# Patient Record
Sex: FEMALE | Race: ASIAN | Employment: STUDENT | URBAN - METROPOLITAN AREA
[De-identification: names, ages, dates, MRNs, and addresses within clinical notes are randomized per-mention and may not be internally consistent; named-entity substitution may affect disease eponyms.]

---

## 2019-07-03 ENCOUNTER — OFFICE VISIT (OUTPATIENT)
Dept: FAMILY MEDICINE CLINIC | Facility: CLINIC | Age: 19
End: 2019-07-03
Payer: COMMERCIAL

## 2019-07-03 ENCOUNTER — APPOINTMENT (OUTPATIENT)
Dept: LAB | Facility: HOSPITAL | Age: 19
End: 2019-07-03
Attending: FAMILY MEDICINE
Payer: COMMERCIAL

## 2019-07-03 VITALS
OXYGEN SATURATION: 98 % | RESPIRATION RATE: 13 BRPM | DIASTOLIC BLOOD PRESSURE: 76 MMHG | WEIGHT: 109 LBS | HEART RATE: 86 BPM | BODY MASS INDEX: 21.97 KG/M2 | HEIGHT: 59 IN | SYSTOLIC BLOOD PRESSURE: 118 MMHG

## 2019-07-03 DIAGNOSIS — Z02.1 PRE-EMPLOYMENT HEALTH SCREENING EXAMINATION: ICD-10-CM

## 2019-07-03 DIAGNOSIS — Z00.00 WELLNESS EXAMINATION: Primary | ICD-10-CM

## 2019-07-03 LAB
AMPHET UR QL SCN: NEGATIVE
BARBITURATES UR QL SCN: NEGATIVE
BENZODIAZ UR QL SCN: NEGATIVE
CANNABINOIDS UR QL SCN: NEGATIVE
COCAINE UR QL: NEGATIVE
HBV SURFACE AB SER QL: NONREACTIVE
HBV SURFACE AB SERPL IA-ACNC: <3.1 MIU/ML
MDMA UR QL SCN: NEGATIVE
METHADONE UR QL SCN: NEGATIVE
OPIATES UR QL SCN: NEGATIVE
OXYCODONE UR QL SCN: NEGATIVE
PCP UR QL SCN: NEGATIVE
RUBV IGG SER QL: POSITIVE
RUBV IGG SER-ACNC: 361.8 IU/ML (ref 10–?)

## 2019-07-03 PROCEDURE — 86765 RUBEOLA ANTIBODY: CPT

## 2019-07-03 PROCEDURE — 86480 TB TEST CELL IMMUN MEASURE: CPT

## 2019-07-03 PROCEDURE — 80307 DRUG TEST PRSMV CHEM ANLYZR: CPT

## 2019-07-03 PROCEDURE — 86706 HEP B SURFACE ANTIBODY: CPT

## 2019-07-03 PROCEDURE — 86735 MUMPS ANTIBODY: CPT

## 2019-07-03 PROCEDURE — 36415 COLL VENOUS BLD VENIPUNCTURE: CPT

## 2019-07-03 PROCEDURE — 86762 RUBELLA ANTIBODY: CPT

## 2019-07-03 PROCEDURE — 86787 VARICELLA-ZOSTER ANTIBODY: CPT

## 2019-07-03 PROCEDURE — 99385 PREV VISIT NEW AGE 18-39: CPT | Performed by: FAMILY MEDICINE

## 2019-07-03 NOTE — PROGRESS NOTES
CC: Annual Physical Exam    HPI:   Leon Mascorro is a 19year old female who presents for a complete physical exam.    HCM  -Diet:  Well-balanced.   -Exercise regularly  -Mental Health: denies any depression or anxiety sx  -Skin care:  no concerning lesio Date)   SpO2 98%   BMI 22.02 kg/m²  Estimated body mass index is 22.02 kg/m² as calculated from the following:    Height as of this encounter: 59\". Weight as of this encounter: 109 lb.    Wt Readings from Last 3 Encounters:  07/03/19 : 109 lb      Physi IGG-IMMUNITY; Future  - VARICELLA ZOSTER, IGG; Future  - MUMPS ANTIBODIES, IGG-IMMUNITY;  Future  - QUANTIFERON TB; Future  - DRUG ABUSE PANEL 10 SCREEN; Future    Annual Physical due on 10/12/2001  Annual Depression Screen due on 10/12/2010  HPV Vaccines(1

## 2019-07-05 LAB
M TB IFN-G CD4+ T-CELLS BLD-ACNC: 0.01 IU/ML
M TB TUBERC IFN-G BLD QL: NEGATIVE
M TB TUBERC IGNF/MITOGEN IGNF CONTROL: >10 IU/ML
QUANTIFERON TB1 MINUS NIL: 0.01 IU/ML
QUANTIFERON TB2 MINUS NIL: 0.02 IU/ML

## 2019-07-08 LAB
MEV IGG SER-ACNC: 119 AU/ML (ref 30–?)
MUV IGG SER IA-ACNC: 30.4 AU/ML (ref 11–?)
VZV IGG SER IA-ACNC: 2101 (ref 165–?)

## 2019-07-09 ENCOUNTER — TELEPHONE (OUTPATIENT)
Dept: FAMILY MEDICINE CLINIC | Facility: CLINIC | Age: 19
End: 2019-07-09

## 2019-07-09 NOTE — TELEPHONE ENCOUNTER
----- Message from Dania Le MD sent at 7/9/2019  3:48 PM CDT -----  Please let her know that all her titers are negative as well as the tuberculosis  And drug screen

## 2019-07-09 NOTE — TELEPHONE ENCOUNTER
Calling to get lab results. Patient as also stating she spoke to her school and they do not have a specific form that needs to be filled out for the physical. They only require a letter stating patient is in good health and the blood work  results.

## 2019-08-13 ENCOUNTER — OFFICE VISIT (OUTPATIENT)
Dept: FAMILY MEDICINE CLINIC | Facility: CLINIC | Age: 19
End: 2019-08-13
Payer: COMMERCIAL

## 2019-08-13 VITALS
OXYGEN SATURATION: 98 % | WEIGHT: 110 LBS | DIASTOLIC BLOOD PRESSURE: 62 MMHG | HEIGHT: 59 IN | HEART RATE: 86 BPM | SYSTOLIC BLOOD PRESSURE: 110 MMHG | BODY MASS INDEX: 22.18 KG/M2

## 2019-08-13 DIAGNOSIS — H61.22 IMPACTED CERUMEN OF LEFT EAR: Primary | ICD-10-CM

## 2019-08-13 DIAGNOSIS — Z23 NEED FOR VACCINATION: ICD-10-CM

## 2019-08-13 PROCEDURE — 90471 IMMUNIZATION ADMIN: CPT | Performed by: FAMILY MEDICINE

## 2019-08-13 PROCEDURE — 90746 HEPB VACCINE 3 DOSE ADULT IM: CPT | Performed by: FAMILY MEDICINE

## 2019-08-13 PROCEDURE — 69210 REMOVE IMPACTED EAR WAX UNI: CPT | Performed by: FAMILY MEDICINE

## 2019-08-13 PROCEDURE — 99213 OFFICE O/P EST LOW 20 MIN: CPT | Performed by: FAMILY MEDICINE

## 2019-08-15 NOTE — PROGRESS NOTES
HPI:   Patient presents with:  Ear Problem: c/c left ear feels clogged      Taylor Torres is a 19year old female presenting for:    Left ear feels clogged   -for the past few days  -hearing is muffled  -no pain, discharge  -no recent swimming  -tried d No past surgical history on file.   No Known Allergies   Social History:  Social History    Tobacco Use      Smoking status: Never Smoker      Smokeless tobacco: Never Used    Alcohol use: Never      Frequency: Never    Drug use: Not on file     Family Hi There is no tenderness. Neurological: No cranial nerve deficit. Skin: No rash noted.            ASSESSMENT AND PLAN:   Patient is a 19year old female who presents primarily presents for:    Diagnoses and all orders for this visit:    Impacted cerumen o

## 2019-08-15 NOTE — PROCEDURES
Cerumen Removal Procedure. Patient gave verbal consent. Risks and Benefits of removal were discussed with the patient, who agreed to proceed with procedure.    On left Ear  Indication: cerumen impaction, TM not visible  Prepped with Hydrogen Peroxide,

## 2020-02-06 ENCOUNTER — NURSE ONLY (OUTPATIENT)
Dept: FAMILY MEDICINE CLINIC | Facility: CLINIC | Age: 20
End: 2020-02-06
Payer: COMMERCIAL

## 2020-02-06 DIAGNOSIS — Z23 NEED FOR HEPATITIS B BOOSTER VACCINATION: Primary | ICD-10-CM

## 2020-02-06 PROCEDURE — 90746 HEPB VACCINE 3 DOSE ADULT IM: CPT | Performed by: FAMILY MEDICINE

## 2020-02-06 PROCEDURE — 90471 IMMUNIZATION ADMIN: CPT | Performed by: FAMILY MEDICINE

## 2020-02-06 NOTE — PROGRESS NOTES
Patient came in today to get 2ND Hep B vaccine, name and  of patient were verified and it was given on the R arm without complications.

## 2020-08-12 ENCOUNTER — NURSE ONLY (OUTPATIENT)
Dept: FAMILY MEDICINE CLINIC | Facility: CLINIC | Age: 20
End: 2020-08-12
Payer: COMMERCIAL

## 2020-08-12 PROCEDURE — 90746 HEPB VACCINE 3 DOSE ADULT IM: CPT | Performed by: FAMILY MEDICINE

## 2020-08-12 PROCEDURE — 36415 COLL VENOUS BLD VENIPUNCTURE: CPT | Performed by: FAMILY MEDICINE

## 2020-08-12 PROCEDURE — 90471 IMMUNIZATION ADMIN: CPT | Performed by: FAMILY MEDICINE

## 2020-08-13 ENCOUNTER — APPOINTMENT (OUTPATIENT)
Dept: LAB | Facility: HOSPITAL | Age: 20
End: 2020-08-13
Attending: INTERNAL MEDICINE
Payer: COMMERCIAL

## 2020-08-13 ENCOUNTER — OFFICE VISIT (OUTPATIENT)
Dept: FAMILY MEDICINE CLINIC | Facility: CLINIC | Age: 20
End: 2020-08-13
Payer: COMMERCIAL

## 2020-08-13 VITALS
HEIGHT: 58 IN | WEIGHT: 100 LBS | BODY MASS INDEX: 20.99 KG/M2 | SYSTOLIC BLOOD PRESSURE: 86 MMHG | DIASTOLIC BLOOD PRESSURE: 60 MMHG

## 2020-08-13 DIAGNOSIS — Z00.00 ANNUAL PHYSICAL EXAM: Primary | ICD-10-CM

## 2020-08-13 DIAGNOSIS — Z00.00 ANNUAL PHYSICAL EXAM: ICD-10-CM

## 2020-08-13 PROCEDURE — 36415 COLL VENOUS BLD VENIPUNCTURE: CPT | Performed by: INTERNAL MEDICINE

## 2020-08-13 PROCEDURE — 36415 COLL VENOUS BLD VENIPUNCTURE: CPT

## 2020-08-13 PROCEDURE — 3008F BODY MASS INDEX DOCD: CPT | Performed by: INTERNAL MEDICINE

## 2020-08-13 PROCEDURE — 3078F DIAST BP <80 MM HG: CPT | Performed by: INTERNAL MEDICINE

## 2020-08-13 PROCEDURE — 86480 TB TEST CELL IMMUN MEASURE: CPT

## 2020-08-13 PROCEDURE — 3074F SYST BP LT 130 MM HG: CPT | Performed by: INTERNAL MEDICINE

## 2020-08-13 PROCEDURE — 99395 PREV VISIT EST AGE 18-39: CPT | Performed by: INTERNAL MEDICINE

## 2020-08-14 LAB
M TB IFN-G CD4+ T-CELLS BLD-ACNC: 0.02 IU/ML
M TB TUBERC IFN-G BLD QL: NEGATIVE
M TB TUBERC IGNF/MITOGEN IGNF CONTROL: >10 IU/ML
QUANTIFERON TB1 MINUS NIL: 0.01 IU/ML
QUANTIFERON TB2 MINUS NIL: 0.02 IU/ML

## 2020-08-19 NOTE — PROGRESS NOTES
[facility]  Return Patient Progress Note      HPI:   Patient presents with:  Physical: yearly exam no pap  Lab: needs order for Maverick Hansen is a 22year old female presenting for: annual exam.  Requesting quantife found for: CHOLEST, HDL, TRIG, LDL, NONHDLC     Medications:  No current outpatient medications on file. PMH:  No past medical history on file. PSH:  No past surgical history on file.     Allergies:  No Known Allergies   Social History:  Social Hi Position: Sitting, Cuff Size: adult)   Ht 58\"   Wt 100 lb (45.4 kg)   LMP 07/13/2020 (Approximate)   BMI 20.90 kg/m²  Estimated body mass index is 20.9 kg/m² as calculated from the following:    Height as of this encounter: 58\".     Weight as of this enco Female 2-dose series) due on 10/12/2009  Pap Smear,3 Years due on 10/12/2019  Annual Physical due on 07/03/2020  Influenza Vaccine(1) due on 09/01/2020  Annual Depression Screen due on 08/13/2021  DTaP,Tdap,and Td Vaccines(4 - Td) due on 08/12/2023  Hepati

## 2020-08-21 ENCOUNTER — LAB ENCOUNTER (OUTPATIENT)
Dept: LAB | Age: 20
End: 2020-08-21
Attending: INTERNAL MEDICINE
Payer: COMMERCIAL

## 2020-08-21 ENCOUNTER — OFFICE VISIT (OUTPATIENT)
Dept: INTERNAL MEDICINE CLINIC | Facility: CLINIC | Age: 20
End: 2020-08-21
Payer: COMMERCIAL

## 2020-08-21 VITALS
SYSTOLIC BLOOD PRESSURE: 107 MMHG | HEART RATE: 66 BPM | HEIGHT: 58 IN | RESPIRATION RATE: 16 BRPM | WEIGHT: 99.19 LBS | DIASTOLIC BLOOD PRESSURE: 72 MMHG | BODY MASS INDEX: 20.82 KG/M2

## 2020-08-21 DIAGNOSIS — N92.6 IRREGULAR PERIODS: Primary | ICD-10-CM

## 2020-08-21 DIAGNOSIS — N92.6 IRREGULAR PERIODS: ICD-10-CM

## 2020-08-21 LAB
ALBUMIN SERPL-MCNC: 3.8 G/DL (ref 3.4–5)
ALBUMIN/GLOB SERPL: 1.1 {RATIO} (ref 1–2)
ALP LIVER SERPL-CCNC: 126 U/L (ref 52–144)
ALT SERPL-CCNC: 85 U/L (ref 13–56)
ANION GAP SERPL CALC-SCNC: 8 MMOL/L (ref 0–18)
AST SERPL-CCNC: 46 U/L (ref 15–37)
B-HCG UR QL: NEGATIVE
BASOPHILS # BLD AUTO: 0.06 X10(3) UL (ref 0–0.2)
BASOPHILS NFR BLD AUTO: 1.2 %
BILIRUB SERPL-MCNC: 0.3 MG/DL (ref 0.1–2)
BUN BLD-MCNC: 20 MG/DL (ref 7–18)
BUN/CREAT SERPL: 25 (ref 10–20)
CALCIUM BLD-MCNC: 9.4 MG/DL (ref 8.5–10.1)
CHLORIDE SERPL-SCNC: 108 MMOL/L (ref 98–112)
CO2 SERPL-SCNC: 26 MMOL/L (ref 21–32)
CREAT BLD-MCNC: 0.8 MG/DL (ref 0.55–1.02)
DEPRECATED RDW RBC AUTO: 41.5 FL (ref 35.1–46.3)
EOSINOPHIL # BLD AUTO: 0.2 X10(3) UL (ref 0–0.7)
EOSINOPHIL NFR BLD AUTO: 4.1 %
ERYTHROCYTE [DISTWIDTH] IN BLOOD BY AUTOMATED COUNT: 12.9 % (ref 11–15)
GLOBULIN PLAS-MCNC: 3.6 G/DL (ref 2.8–4.4)
GLUCOSE BLD-MCNC: 77 MG/DL (ref 70–99)
HCT VFR BLD AUTO: 40 % (ref 35–48)
HGB BLD-MCNC: 13.5 G/DL (ref 12–16)
IMM GRANULOCYTES # BLD AUTO: 0.01 X10(3) UL (ref 0–1)
IMM GRANULOCYTES NFR BLD: 0.2 %
LYMPHOCYTES # BLD AUTO: 1.27 X10(3) UL (ref 1–4)
LYMPHOCYTES NFR BLD AUTO: 26.3 %
M PROTEIN MFR SERPL ELPH: 7.4 G/DL (ref 6.4–8.2)
MCH RBC QN AUTO: 29.7 PG (ref 26–34)
MCHC RBC AUTO-ENTMCNC: 33.8 G/DL (ref 31–37)
MCV RBC AUTO: 88.1 FL (ref 80–100)
MONOCYTES # BLD AUTO: 0.45 X10(3) UL (ref 0.1–1)
MONOCYTES NFR BLD AUTO: 9.3 %
NEUTROPHILS # BLD AUTO: 2.83 X10 (3) UL (ref 1.5–7.7)
NEUTROPHILS # BLD AUTO: 2.83 X10(3) UL (ref 1.5–7.7)
NEUTROPHILS NFR BLD AUTO: 58.9 %
OSMOLALITY SERPL CALC.SUM OF ELEC: 295 MOSM/KG (ref 275–295)
PATIENT FASTING Y/N/NP: YES
PLATELET # BLD AUTO: 291 10(3)UL (ref 150–450)
POTASSIUM SERPL-SCNC: 4 MMOL/L (ref 3.5–5.1)
RBC # BLD AUTO: 4.54 X10(6)UL (ref 3.8–5.3)
SODIUM SERPL-SCNC: 142 MMOL/L (ref 136–145)
TSI SER-ACNC: 0.6 MIU/ML (ref 0.36–3.74)
WBC # BLD AUTO: 4.8 X10(3) UL (ref 4–11)

## 2020-08-21 PROCEDURE — 81025 URINE PREGNANCY TEST: CPT

## 2020-08-21 PROCEDURE — 84443 ASSAY THYROID STIM HORMONE: CPT

## 2020-08-21 PROCEDURE — 3008F BODY MASS INDEX DOCD: CPT | Performed by: INTERNAL MEDICINE

## 2020-08-21 PROCEDURE — 3078F DIAST BP <80 MM HG: CPT | Performed by: INTERNAL MEDICINE

## 2020-08-21 PROCEDURE — 3074F SYST BP LT 130 MM HG: CPT | Performed by: INTERNAL MEDICINE

## 2020-08-21 PROCEDURE — 85025 COMPLETE CBC W/AUTO DIFF WBC: CPT

## 2020-08-21 PROCEDURE — 80053 COMPREHEN METABOLIC PANEL: CPT

## 2020-08-21 PROCEDURE — 99203 OFFICE O/P NEW LOW 30 MIN: CPT | Performed by: INTERNAL MEDICINE

## 2020-08-21 PROCEDURE — 36415 COLL VENOUS BLD VENIPUNCTURE: CPT

## 2020-08-21 NOTE — PATIENT INSTRUCTIONS
Hormones and Your Menstrual Cycle  A woman's menstrual cycle (monthly period) is controlled by changing levels of certain hormones. These hormones travel through the blood. Two hormones, estrogen and progesterone, play a big role in the menstrual cycle.

## 2020-08-21 NOTE — PROGRESS NOTES
Rommel Delgado is a 22year old female.   Patient presents with:  Irregular Periods      HPI:   New pt -- pts pcp -- last seen aug -- dr Merari Yañez  C/c irregular periods   C/o LMP late May --since then spotting for one day , not more than a day --   M thyroid and refer to GYN      The patient indicates understanding of these issues and agrees to the plan. No follow-ups on file.

## 2020-08-26 ENCOUNTER — TELEPHONE (OUTPATIENT)
Dept: INTERNAL MEDICINE CLINIC | Facility: CLINIC | Age: 20
End: 2020-08-26

## 2020-08-26 DIAGNOSIS — R74.8 ELEVATED LIVER ENZYMES: Primary | ICD-10-CM

## 2020-08-26 NOTE — TELEPHONE ENCOUNTER
Patient calling asking to review lab results ;    Provided information to call Central scheduling at [phone] to have future blood work drawn   Patient verbalizes understanding and agrees.            Patient Result Comments     Viewed by Deisi Blevins

## 2020-11-06 ENCOUNTER — MOBILE ENCOUNTER (OUTPATIENT)
Dept: INTERNAL MEDICINE CLINIC | Facility: CLINIC | Age: 20
End: 2020-11-06

## 2020-11-06 ENCOUNTER — LAB ENCOUNTER (OUTPATIENT)
Dept: LAB | Age: 20
End: 2020-11-06
Attending: INTERNAL MEDICINE
Payer: COMMERCIAL

## 2020-11-06 DIAGNOSIS — Z01.84 IMMUNITY TO HEPATITIS B VIRUS DEMONSTRATED BY SEROLOGIC TEST: Primary | ICD-10-CM

## 2020-11-06 DIAGNOSIS — R74.8 ELEVATED LIVER ENZYMES: ICD-10-CM

## 2020-11-06 DIAGNOSIS — Z01.84 IMMUNITY TO HEPATITIS B VIRUS DEMONSTRATED BY SEROLOGIC TEST: ICD-10-CM

## 2020-11-06 DIAGNOSIS — R74.8 ELEVATED LIVER ENZYMES: Primary | ICD-10-CM

## 2020-11-06 PROCEDURE — 86706 HEP B SURFACE ANTIBODY: CPT

## 2020-11-06 PROCEDURE — 80053 COMPREHEN METABOLIC PANEL: CPT

## 2020-11-06 PROCEDURE — 36415 COLL VENOUS BLD VENIPUNCTURE: CPT

## 2024-03-27 ENCOUNTER — LAB ENCOUNTER (OUTPATIENT)
Dept: LAB | Facility: HOSPITAL | Age: 24
End: 2024-03-27
Attending: INTERNAL MEDICINE
Payer: COMMERCIAL

## 2024-03-27 DIAGNOSIS — Z01.84 IMMUNITY STATUS TESTING: ICD-10-CM

## 2024-03-27 DIAGNOSIS — Z11.1 SCREENING-PULMONARY TB: ICD-10-CM

## 2024-03-27 LAB
RUBV IGG SER QL: POSITIVE
RUBV IGG SER-ACNC: 292.7 IU/ML (ref 10–?)

## 2024-03-27 PROCEDURE — 86762 RUBELLA ANTIBODY: CPT

## 2024-03-27 PROCEDURE — 86480 TB TEST CELL IMMUN MEASURE: CPT

## 2024-03-27 PROCEDURE — 36415 COLL VENOUS BLD VENIPUNCTURE: CPT

## 2024-03-27 PROCEDURE — 86765 RUBEOLA ANTIBODY: CPT | Performed by: INTERNAL MEDICINE

## 2024-03-27 PROCEDURE — 86706 HEP B SURFACE ANTIBODY: CPT | Performed by: INTERNAL MEDICINE

## 2024-03-27 PROCEDURE — 86787 VARICELLA-ZOSTER ANTIBODY: CPT

## 2024-03-27 PROCEDURE — 86735 MUMPS ANTIBODY: CPT

## 2024-03-28 LAB
M TB IFN-G CD4+ T-CELLS BLD-ACNC: 0.03 IU/ML
M TB TUBERC IFN-G BLD QL: NEGATIVE
M TB TUBERC IGNF/MITOGEN IGNF CONTROL: >10 IU/ML
QFT TB1 AG MINUS NIL: 0.06 IU/ML
QFT TB2 AG MINUS NIL: 0.07 IU/ML

## 2024-03-29 LAB
MUV IGG SER IA-ACNC: 15.1 AU/ML (ref 11–?)
VZV IGG SER IA-ACNC: 1687 (ref 165–?)

## 2024-07-29 ENCOUNTER — HOSPITAL ENCOUNTER (EMERGENCY)
Facility: HOSPITAL | Age: 24
Discharge: HOME | End: 2024-07-29
Attending: EMERGENCY MEDICINE
Payer: COMMERCIAL

## 2024-07-29 ENCOUNTER — HOSPITAL ENCOUNTER (OUTPATIENT)
Age: 24
Discharge: ANOTHER HOSPITAL | End: 2024-07-29
Payer: COMMERCIAL

## 2024-07-29 VITALS
TEMPERATURE: 99 F | DIASTOLIC BLOOD PRESSURE: 55 MMHG | OXYGEN SATURATION: 97 % | HEIGHT: 59 IN | RESPIRATION RATE: 22 BRPM | BODY MASS INDEX: 21.97 KG/M2 | SYSTOLIC BLOOD PRESSURE: 108 MMHG | WEIGHT: 109 LBS | HEART RATE: 76 BPM

## 2024-07-29 VITALS
OXYGEN SATURATION: 100 % | SYSTOLIC BLOOD PRESSURE: 74 MMHG | RESPIRATION RATE: 24 BRPM | HEART RATE: 74 BPM | TEMPERATURE: 97 F | DIASTOLIC BLOOD PRESSURE: 61 MMHG

## 2024-07-29 DIAGNOSIS — R11.2 NAUSEA AND VOMITING, UNSPECIFIED VOMITING TYPE: ICD-10-CM

## 2024-07-29 DIAGNOSIS — R55 SYNCOPE, VASOVAGAL: ICD-10-CM

## 2024-07-29 DIAGNOSIS — R10.9 ABDOMINAL PAIN, ACUTE: Primary | ICD-10-CM

## 2024-07-29 DIAGNOSIS — D50.0 IRON DEFICIENCY ANEMIA DUE TO CHRONIC BLOOD LOSS: ICD-10-CM

## 2024-07-29 DIAGNOSIS — N94.6 MENSTRUAL CRAMPS: Primary | ICD-10-CM

## 2024-07-29 LAB
ALBUMIN SERPL-MCNC: 4.1 G/DL (ref 3.2–4.8)
ALBUMIN/GLOB SERPL: 1.8 {RATIO} (ref 1–2)
ALP LIVER SERPL-CCNC: 63 U/L
ALT SERPL-CCNC: 14 U/L
ANION GAP SERPL CALC-SCNC: 10 MMOL/L (ref 0–18)
AST SERPL-CCNC: 26 U/L (ref ?–34)
BILIRUB SERPL-MCNC: 0.3 MG/DL (ref 0.3–1.2)
BUN BLD-MCNC: 16 MG/DL (ref 9–23)
BUN/CREAT SERPL: 16 (ref 10–20)
CALCIUM BLD-MCNC: 8.6 MG/DL (ref 8.7–10.4)
CHLORIDE SERPL-SCNC: 111 MMOL/L (ref 98–112)
CO2 SERPL-SCNC: 22 MMOL/L (ref 21–32)
CREAT BLD-MCNC: 1 MG/DL
EGFRCR SERPLBLD CKD-EPI 2021: 80 ML/MIN/1.73M2 (ref 60–?)
GLOBULIN PLAS-MCNC: 2.3 G/DL (ref 2–3.5)
GLUCOSE BLD-MCNC: 98 MG/DL (ref 70–99)
GLUCOSE BLDC GLUCOMTR-MCNC: 88 MG/DL (ref 70–99)
LIPASE SERPL-CCNC: 35 U/L (ref 12–53)
OSMOLALITY SERPL CALC.SUM OF ELEC: 297 MOSM/KG (ref 275–295)
POTASSIUM SERPL-SCNC: 3.6 MMOL/L (ref 3.5–5.1)
PROT SERPL-MCNC: 6.4 G/DL (ref 5.7–8.2)
SODIUM SERPL-SCNC: 143 MMOL/L (ref 136–145)

## 2024-07-29 PROCEDURE — 85025 COMPLETE CBC W/AUTO DIFF WBC: CPT | Performed by: EMERGENCY MEDICINE

## 2024-07-29 PROCEDURE — 80053 COMPREHEN METABOLIC PANEL: CPT | Performed by: EMERGENCY MEDICINE

## 2024-07-29 PROCEDURE — 99284 EMERGENCY DEPT VISIT MOD MDM: CPT

## 2024-07-29 PROCEDURE — 85060 BLOOD SMEAR INTERPRETATION: CPT | Performed by: EMERGENCY MEDICINE

## 2024-07-29 PROCEDURE — 85025 COMPLETE CBC W/AUTO DIFF WBC: CPT

## 2024-07-29 PROCEDURE — 82962 GLUCOSE BLOOD TEST: CPT

## 2024-07-29 PROCEDURE — 80053 COMPREHEN METABOLIC PANEL: CPT

## 2024-07-29 PROCEDURE — 36415 COLL VENOUS BLD VENIPUNCTURE: CPT

## 2024-07-29 PROCEDURE — 96374 THER/PROPH/DIAG INJ IV PUSH: CPT | Performed by: NURSE PRACTITIONER

## 2024-07-29 PROCEDURE — 99215 OFFICE O/P EST HI 40 MIN: CPT | Performed by: NURSE PRACTITIONER

## 2024-07-29 PROCEDURE — 83690 ASSAY OF LIPASE: CPT | Performed by: EMERGENCY MEDICINE

## 2024-07-29 PROCEDURE — 83690 ASSAY OF LIPASE: CPT

## 2024-07-29 PROCEDURE — S0119 ONDANSETRON 4 MG: HCPCS | Performed by: EMERGENCY MEDICINE

## 2024-07-29 PROCEDURE — 85060 BLOOD SMEAR INTERPRETATION: CPT

## 2024-07-29 RX ORDER — ONDANSETRON 2 MG/ML
4 INJECTION INTRAMUSCULAR; INTRAVENOUS ONCE
Status: DISCONTINUED | OUTPATIENT
Start: 2024-07-29 | End: 2024-07-29

## 2024-07-29 RX ORDER — KETOROLAC TROMETHAMINE 15 MG/ML
15 INJECTION, SOLUTION INTRAMUSCULAR; INTRAVENOUS ONCE
Status: DISCONTINUED | OUTPATIENT
Start: 2024-07-29 | End: 2024-07-29

## 2024-07-29 RX ORDER — ONDANSETRON 4 MG/1
4 TABLET, ORALLY DISINTEGRATING ORAL ONCE
Status: COMPLETED | OUTPATIENT
Start: 2024-07-29 | End: 2024-07-29

## 2024-07-29 RX ORDER — IBUPROFEN 600 MG/1
600 TABLET ORAL ONCE
Status: COMPLETED | OUTPATIENT
Start: 2024-07-29 | End: 2024-07-29

## 2024-07-29 RX ORDER — ONDANSETRON 2 MG/ML
4 INJECTION INTRAMUSCULAR; INTRAVENOUS ONCE
Status: COMPLETED | OUTPATIENT
Start: 2024-07-29 | End: 2024-07-29

## 2024-07-29 RX ORDER — ONDANSETRON 4 MG/1
4 TABLET, ORALLY DISINTEGRATING ORAL EVERY 4 HOURS PRN
Qty: 15 TABLET | Refills: 0 | Status: SHIPPED | OUTPATIENT
Start: 2024-07-29

## 2024-07-29 RX ORDER — SODIUM CHLORIDE 9 MG/ML
1000 INJECTION, SOLUTION INTRAVENOUS ONCE
Status: COMPLETED | OUTPATIENT
Start: 2024-07-29 | End: 2024-07-29

## 2024-07-29 RX ORDER — KETOROLAC TROMETHAMINE 10 MG/1
10 TABLET, FILM COATED ORAL EVERY 6 HOURS PRN
Qty: 20 TABLET | Refills: 0 | Status: SHIPPED | OUTPATIENT
Start: 2024-07-29 | End: 2024-08-03

## 2024-07-29 NOTE — ED INITIAL ASSESSMENT (HPI)
Pt presents to ed from urgent care with c/o abdominal pain. Per ems, pt reports abdominal pain that started around 1700 after she ate almonds. Pt states she usually gets abdominal pain this bad when she started her period. 1000mg tylenol at 1700 with no relief.pt aox4, speaking in full sentences    4mg IV zofran AT urgent care

## 2024-07-29 NOTE — ED PROVIDER NOTES
Patient Seen in: [facility]      History     Chief Complaint   Patient presents with    Abdominal Pain     Stated Complaint: ABDOMINAL PAIN  Subjective:   HPI    This is a 23-year-old female who presents with sudden onset of abdominal pain over the course of the last 90 minutes.  Patient states she commonly has heavy periods which does cause severe abdominal pain but is commonly relieved after Tylenol.  She states she took Tylenol at 5 PM with no improvement in pain.  Also reports feeling dizzy, lightheaded.  Changing her pad every 2 hours.  States she may be bleeding a little bit more than normal.  Also complaining of shortness of breath.  Denies any cough or congestion.       Objective:   History reviewed. No pertinent past medical history.         History reviewed. No pertinent surgical history.           No pertinent social history.          Review of Systems   All other systems reviewed and are negative.      Positive for stated complaint: Abdominal Pain    Other systems are as noted in HPI.  Constitutional and vital signs reviewed.      All other systems reviewed and negative except as noted above.    Physical Exam     ED Triage Vitals [07/29/24 1820]   BP 96/71   Pulse 74   Resp 24   Temp 97 °F (36.1 °C)   Temp src Temporal   SpO2 100 %   O2 Device None (Room air)     Current:BP (!) 74/61   Pulse 74   Temp 97 °F (36.1 °C) (Temporal)   Resp 24   SpO2 100%     Physical Exam  Vitals and nursing note reviewed.   Constitutional:       General: She is awake. She is in acute distress.      Appearance: Normal appearance. She is not ill-appearing, toxic-appearing or diaphoretic.   HENT:      Head: Normocephalic and atraumatic.      Right Ear: Tympanic membrane, ear canal and external ear normal.      Left Ear: Tympanic membrane, ear canal and external ear normal.      Nose: Nose normal.      Mouth/Throat:      Mouth: Mucous membranes are moist.      Pharynx: Oropharynx is clear. Uvula midline.   Eyes:       General: Lids are normal.      Extraocular Movements: Extraocular movements intact.      Conjunctiva/sclera: Conjunctivae normal.      Pupils: Pupils are equal, round, and reactive to light.   Cardiovascular:      Rate and Rhythm: Normal rate and regular rhythm.      Pulses: Normal pulses.      Heart sounds: Normal heart sounds.   Pulmonary:      Effort: Pulmonary effort is normal.      Breath sounds: Normal breath sounds and air entry. No stridor, decreased air movement or transmitted upper airway sounds.   Abdominal:      Tenderness: There is generalized abdominal tenderness. There is guarding.   Skin:     General: Skin is warm and dry.      Capillary Refill: Capillary refill takes less than 2 seconds.      Coloration: Skin is pale.   Neurological:      General: No focal deficit present.      Mental Status: She is alert and oriented to person, place, and time.   Psychiatric:         Mood and Affect: Mood normal.         Behavior: Behavior normal. Behavior is cooperative.         Thought Content: Thought content normal.         Judgment: Judgment normal.       ED Course     No results found.  Labs Reviewed - No data to display    MDM     Medical Decision Making  Patient presents with parents in wheelchair, with severe abdominal pain, hypotensive.  discussed need for higher level of care at this time.  EMS transported patient to [facility].  IV fluid and Zofran administered prior to start        Disposition and Plan     Clinical Impression:  1. Abdominal pain, acute    2. Nausea and vomiting, unspecified vomiting type         Disposition:  Ic to ed  7/29/2024  6:29 pm    Follow-up:  No follow-up provider specified.        Medications Prescribed:  There are no discharge medications for this patient.         Note to patient: The 21st Century cares act makes medical notes like these available to patients in the interest of transparency.  However, be advised this medical document and is intended as peer to  peer communication.  It is read the medical language and may contain abbreviations or verbiage that are unfamiliar.  It may appear blunt or direct.  Medical documents are intended to carry relevant information, fax is evident and the clinical opinion of the practitioner.    This note was prepared using Dragon Medical voice recognition dictation software.  As a result, errors may occur.  When identified, these errors have been corrected.  While every attempt is made to correct errors during dictation, discrepancies may still exist.    Nasrin Heredia, MASON  7/29/2024  6:29 PM

## 2024-07-30 LAB
BASOPHILS # BLD AUTO: 0.04 X10(3) UL (ref 0–0.2)
BASOPHILS NFR BLD AUTO: 0.3 %
DEPRECATED RDW RBC AUTO: 42.8 FL (ref 35.1–46.3)
EOSINOPHIL # BLD AUTO: 0.03 X10(3) UL (ref 0–0.7)
EOSINOPHIL NFR BLD AUTO: 0.3 %
ERYTHROCYTE [DISTWIDTH] IN BLOOD BY AUTOMATED COUNT: 17.2 % (ref 11–15)
HCT VFR BLD AUTO: 29.4 %
HGB BLD-MCNC: 9.6 G/DL
IMM GRANULOCYTES # BLD AUTO: 0.05 X10(3) UL (ref 0–1)
IMM GRANULOCYTES NFR BLD: 0.4 %
LYMPHOCYTES # BLD AUTO: 0.67 X10(3) UL (ref 1–4)
LYMPHOCYTES NFR BLD AUTO: 5.8 %
MCH RBC QN AUTO: 22.7 PG (ref 26–34)
MCHC RBC AUTO-ENTMCNC: 32.7 G/DL (ref 31–37)
MCV RBC AUTO: 69.7 FL
MONOCYTES # BLD AUTO: 0.62 X10(3) UL (ref 0.1–1)
MONOCYTES NFR BLD AUTO: 5.4 %
NEUTROPHILS # BLD AUTO: 10.06 X10 (3) UL (ref 1.5–7.7)
NEUTROPHILS # BLD AUTO: 10.06 X10(3) UL (ref 1.5–7.7)
NEUTROPHILS NFR BLD AUTO: 87.8 %
PLATELET # BLD AUTO: 337 10(3)UL (ref 150–450)
RBC # BLD AUTO: 4.22 X10(6)UL
WBC # BLD AUTO: 11.5 X10(3) UL (ref 4–11)

## 2024-07-30 NOTE — ED PROVIDER NOTES
Patient Seen in: [facility] Emergency Department    History     Chief Complaint   Patient presents with    Abdomen/Flank Pain       HPI    The patient presents to the ED complaining of painful menstrual cramps for the past several hours.  She states her menstrual cycle started today and she has had similar pain in the past due to menstrual cramps.  She took Tylenol at home with no relief.  Had a syncopal episode at home when she was in the bathroom.  She states no fall or head injury.  Had experienced severe cramps prior to the syncopal episode.  Now presents to the ED for evaluation.  Associated nausea and vomiting today as well.    History reviewed. History reviewed. No pertinent past medical history.    History reviewed. History reviewed. No pertinent surgical history.      Medications :  (Not in a hospital admission)       Family History   Problem Relation Age of Onset    Diabetes Father     No Known Problems Mother     Colon Cancer Neg     Breast Cancer Neg     Uterine Cancer Neg        Smoking Status:   Social History     Socioeconomic History    Marital status: Single   Tobacco Use    Smoking status: Never     Passive exposure: Never    Smokeless tobacco: Never   Vaping Use    Vaping status: Never Used   Substance and Sexual Activity    Alcohol use: Never    Drug use: Never       Constitutional and vital signs reviewed.      Social History and Family History elements reviewed from today, pertinent positives to the presenting problem noted.    Physical Exam     ED Triage Vitals [07/29/24 1855]   /55   Pulse 76   Resp 22   Temp 98.6 °F (37 °C)   Temp src Oral   SpO2 97 %   O2 Device None (Room air)       All measures to prevent infection transmission during my interaction with the patient were taken. Handwashing was performed prior to and after the exam.  Stethoscope and any equipment used during my examination was cleaned with super sani-cloth germicidal wipes following the exam.     Physical  Exam  Vitals and nursing note reviewed.   Constitutional:       General: She is not in acute distress.     Appearance: She is well-developed.   HENT:      Head: Normocephalic and atraumatic.   Eyes:      General:         Right eye: No discharge.         Left eye: No discharge.      Conjunctiva/sclera: Conjunctivae normal.   Neck:      Trachea: No tracheal deviation.   Cardiovascular:      Rate and Rhythm: Normal rate.   Pulmonary:      Effort: Pulmonary effort is normal. No respiratory distress.      Breath sounds: No stridor.   Abdominal:      General: There is no distension.      Palpations: Abdomen is soft.      Tenderness: There is abdominal tenderness in the suprapubic area. There is no guarding or rebound.   Musculoskeletal:         General: No deformity.   Skin:     General: Skin is warm and dry.   Neurological:      Mental Status: She is alert and oriented to person, place, and time.   Psychiatric:         Mood and Affect: Mood normal.         Behavior: Behavior normal.         ED Course        Labs Reviewed   COMP METABOLIC PANEL (14) - Abnormal; Notable for the following components:       Result Value    Calcium, Total 8.6 (*)     Calculated Osmolality 297 (*)     All other components within normal limits   CBC W/ DIFFERENTIAL - Abnormal; Notable for the following components:    WBC 11.5 (*)     HGB 9.6 (*)     HCT 29.4 (*)     MCV 69.7 (*)     MCH 22.7 (*)     RDW 17.2 (*)     Neutrophil Absolute Prelim 10.06 (*)     All other components within normal limits   LIPASE - Normal   POCT GLUCOSE - Normal   CBC WITH DIFFERENTIAL WITH PLATELET    Narrative:     The following orders were created for panel order CBC With Differential With Platelet.                  Procedure                               Abnormality         Status                                     ---------                               -----------         ------                                     CBC W/ DIFFERENTIAL[[id number]]           Abnormal            Preliminary result                                           Please view results for these tests on the individual orders.   MD BLOOD SMEAR CONSULT       As Interpreted by me    Imaging Results Available and Reviewed while in ED: No results found.  ED Medications Administered:   Medications   ibuprofen (Motrin) tab 600 mg (600 mg Oral Given 7/29/24 2044)   ondansetron (Zofran-ODT) disintegrating tab 4 mg (4 mg Oral Given 7/29/24 2044)         MDM     Vitals:    07/29/24 1855   BP: 108/55   Pulse: 76   Resp: 22   Temp: 98.6 °F (37 °C)   TempSrc: Oral   SpO2: 97%   Weight: 49.4 kg   Height: 149.9 cm (4' 11\")     *I personally reviewed and interpreted all ED vitals.    Pulse Ox: 97%, Room air, Normal     Differential Diagnosis/ Diagnostic Considerations: Dysmenorrhea, anemia, other    Complicating Factors: The patient already has does not have a problem list on file. to contribute to the complexity of this ED evaluation.    Medical Decision Making  The patient presents to the ED with painful menstrual cramps.  Nondistressed on examination otherwise.  Laboratory testing with anemia which is likely chronic.  Syncopal episode today likely vasovagal in nature.  No concern for life-threatening cause of syncope.  Patient advised on gynecology follow-up for further evaluation and was given pain meds in the ED for symptoms.    Problems Addressed:  Iron deficiency anemia due to chronic blood loss: chronic illness or injury  Menstrual cramps: chronic illness or injury  Syncope, vasovagal: acute illness or injury that poses a threat to life or bodily functions    Amount and/or Complexity of Data Reviewed  Independent Historian: parent     Details: Mother provides history details  Labs: ordered. Decision-making details documented in ED Course.    Risk  Prescription drug management.        Condition upon leaving the department: Stable    Disposition and Plan     Clinical Impression:  1. Menstrual cramps    2.  Syncope, vasovagal    3. Iron deficiency anemia due to chronic blood loss        Disposition:  Discharge    Follow-up:  Nasrin Barraza MD  [facility] [address] [address]  [phone]    Schedule an appointment as soon as possible for a visit in 3 day(s)        Medications Prescribed:  Discharge Medication List as of 7/29/2024  8:47 PM        START taking these medications    Details   Ketorolac Tromethamine 10 MG Oral Tab Take 1 tablet (10 mg total) by mouth every 6 (six) hours as needed for Pain., Normal, Disp-20 tablet, R-0      ondansetron 4 MG Oral Tablet Dispersible Take 1 tablet (4 mg total) by mouth every 4 (four) hours as needed for Nausea., Normal, Disp-15 tablet, R-0

## 2024-08-13 ENCOUNTER — OFFICE VISIT (OUTPATIENT)
Dept: OBGYN CLINIC | Facility: CLINIC | Age: 24
End: 2024-08-13

## 2024-08-13 ENCOUNTER — LAB ENCOUNTER (OUTPATIENT)
Dept: LAB | Age: 24
End: 2024-08-13
Attending: STUDENT IN AN ORGANIZED HEALTH CARE EDUCATION/TRAINING PROGRAM
Payer: COMMERCIAL

## 2024-08-13 VITALS
BODY MASS INDEX: 21 KG/M2 | DIASTOLIC BLOOD PRESSURE: 66 MMHG | WEIGHT: 105 LBS | SYSTOLIC BLOOD PRESSURE: 109 MMHG | HEART RATE: 93 BPM

## 2024-08-13 DIAGNOSIS — N94.6 DYSMENORRHEA: Primary | ICD-10-CM

## 2024-08-13 DIAGNOSIS — N92.0 MENORRHAGIA WITH REGULAR CYCLE: ICD-10-CM

## 2024-08-13 DIAGNOSIS — D50.8 OTHER IRON DEFICIENCY ANEMIA: ICD-10-CM

## 2024-08-13 LAB
DEPRECATED HBV CORE AB SER IA-ACNC: 2.6 NG/ML
TSI SER-ACNC: 2.6 MIU/ML (ref 0.55–4.78)

## 2024-08-13 PROCEDURE — 36415 COLL VENOUS BLD VENIPUNCTURE: CPT

## 2024-08-13 PROCEDURE — 84443 ASSAY THYROID STIM HORMONE: CPT

## 2024-08-13 PROCEDURE — 3074F SYST BP LT 130 MM HG: CPT | Performed by: STUDENT IN AN ORGANIZED HEALTH CARE EDUCATION/TRAINING PROGRAM

## 2024-08-13 PROCEDURE — 3078F DIAST BP <80 MM HG: CPT | Performed by: STUDENT IN AN ORGANIZED HEALTH CARE EDUCATION/TRAINING PROGRAM

## 2024-08-13 PROCEDURE — 99204 OFFICE O/P NEW MOD 45 MIN: CPT | Performed by: STUDENT IN AN ORGANIZED HEALTH CARE EDUCATION/TRAINING PROGRAM

## 2024-08-13 PROCEDURE — 82728 ASSAY OF FERRITIN: CPT

## 2024-08-13 RX ORDER — MULTIVIT WITH MINERALS/LUTEIN
250 TABLET ORAL EVERY OTHER DAY
Qty: 90 TABLET | Refills: 2 | Status: SHIPPED | OUTPATIENT
Start: 2024-08-13

## 2024-08-13 RX ORDER — FERROUS SULFATE 325(65) MG
325 TABLET ORAL EVERY OTHER DAY
Qty: 90 TABLET | Refills: 3 | Status: SHIPPED | OUTPATIENT
Start: 2024-08-13

## 2024-08-13 NOTE — PROGRESS NOTES
[facility]  Obstetrics and Gynecology  Focused Gynecology Problem Exam      Rocío García is a 23 year old female presenting for Er F/u (ER follow up for heavy cycles and severe cramping )  .    HPI:     Chief Complaint   Patient presents with    Er F/u     ER follow up for heavy cycles and severe cramping      Was in ED on  for severe abd pain and heavy bleeding  Past 2 months has had increase in bleeding and +dysmenorrhea  Did not have much dysmenorrhea with prior menstrual cycles    Regular cycles. Usually last about 4 days, usually has heavy periods, moisés first 2 days  Was taking tylenol w some relief   Then pain returned and went to ED and given ibuprofen and zofran     Also has anemia dx at time of ED visit    Working- nurse- at [employer]  Lives with parents, feels safe at home    Never been sexually active    Menarche: 13 (2024  1:19 PM)  Period Cycle (Days): 28 (2024  1:19 PM)  Period Duration (Days): 4-7 (2024  1:19 PM)  Period Flow: moderate to heavy (2024  1:19 PM)  Use of Birth Control (if yes, specify type): Abstinence (2024  1:19 PM)  Pap Result Notes: never had one (2024  1:19 PM)      Medications (Active prior to today's visit):  Current Outpatient Medications   Medication Sig Dispense Refill    Ferrous Sulfate 325 (65 Fe) MG Oral Tab Take 1 tablet (325 mg total) by mouth every other day. 90 tablet 3    ascorbic acid 250 MG Oral Tab Take 1 tablet (250 mg total) by mouth every other day. 90 tablet 2    ondansetron 4 MG Oral Tablet Dispersible Take 1 tablet (4 mg total) by mouth every 4 (four) hours as needed for Nausea. (Patient not taking: Reported on 2024) 15 tablet 0     Allergies:  No Known Allergies  HISTORY:     OB History    Para Term  AB Living   0 0 0 0 0 0   SAB IAB Ectopic Multiple Live Births   0 0 0 0 0         History reviewed. No pertinent past medical history.    History reviewed. No pertinent surgical  history.    Family History   Problem Relation Age of Onset    Diabetes Father     No Known Problems Mother     Colon Cancer Neg     Breast Cancer Neg     Uterine Cancer Neg        Social History     Socioeconomic History    Marital status: Single     Spouse name: Not on file    Number of children: Not on file    Years of education: Not on file    Highest education level: Not on file   Occupational History    Not on file   Tobacco Use    Smoking status: Never     Passive exposure: Never    Smokeless tobacco: Never   Vaping Use    Vaping status: Never Used   Substance and Sexual Activity    Alcohol use: Never    Drug use: Never    Sexual activity: Not on file   Other Topics Concern    Caffeine Concern Not Asked    Exercise Not Asked    Seat Belt Not Asked    Special Diet Not Asked    Stress Concern Not Asked    Weight Concern Not Asked   Social History Narrative    Not on file     Social Determinants of Health     Financial Resource Strain: Not on file   Food Insecurity: Not on file   Transportation Needs: Not on file   Physical Activity: Not on file   Stress: Not on file   Social Connections: Not on file   Housing Stability: Not on file       ROS:   Review of Systems:    Constitutional:    denies fever / chills  Eyes:     denies blurred or double vision  Cardiovascular:  denies chest pain or palpitations  Respiratory:    denies shortness of breath  Gastrointestinal:  denies severe abdominal pain, frequent diarrhea or constipation, nausea / vomiting  Genitourinary:    denies dysuria, bothersome incontinence  Skin/Breast:   denies any breast pain, lumps, or discharge  Neurological:    denies frequent severe headaches  Psychiatric:   denies depression or anxiety, thoughts of harming self or others  Heme/Lymph:    denies easy bruising or bleeding  PHYSICAL EXAM:   /66   Pulse 93   Wt 105 lb (47.6 kg)   LMP 07/29/2024 (Exact Date)   BMI 21.21 kg/m²     GENERAL: well developed, well nourished, in no apparent  distress  ABDOMEN: Soft, non distended; non tender, no masses  GYNE/: External Genitalia: Normal appearing, no lesions. Urethral meatus appear wnl, no abnormal discharge or lesions noted.          Bladder: well supported, urethra wnl, no lesions or fissures                     Vagina: normal pink mucosa, no lesions, normal clear discharge.                      Uterus: anteverted, mobile, non tender, exam limited due to patient's intolerance                     Cervix: Normal                      Adnexa: non tender, no masses, normal size     ASSESSMENT:          ICD-10-CM    1. Dysmenorrhea  N94.6 US PELVIS (TRANSABDOMINAL PELVIS)  (CPT=76856)      2. Other iron deficiency anemia  D50.8 Ferrous Sulfate 325 (65 Fe) MG Oral Tab     ascorbic acid 250 MG Oral Tab      3. Menorrhagia with regular cycle  N92.0 Ferritin     TSH W Reflex To Free T4     US PELVIS (TRANSABDOMINAL PELVIS)  (CPT=76856)          PLAN:   Reviewed ED course and labs. Recommend iron every other day for anemia  Reviewed ddx for dysmenorrhea and hmb.   Will do pelvic US to r/o pathology. Discussed use of NSAIDs on first 2-3days of menses to help with pain and flow. Discussed synergistic effect with OCPs as well. She desires trial of nsaids.  Will get labs.  Rtc in 3mo or prn       ORDERS:     Orders Placed This Encounter   Procedures    Ferritin    TSH W Reflex To Free T4     PRESCRIPTIONS:     Requested Prescriptions     Signed Prescriptions Disp Refills    Ferrous Sulfate 325 (65 Fe) MG Oral Tab 90 tablet 3     Sig: Take 1 tablet (325 mg total) by mouth every other day.    ascorbic acid 250 MG Oral Tab 90 tablet 2     Sig: Take 1 tablet (250 mg total) by mouth every other day.     IMAGING/ REFERRALS:    US PELVIS (TRANSABDOMINAL PELVIS)  (KGZ=74390)     Nasrin Olivas MD  8/13/2024  1:27 PM

## 2025-03-05 ENCOUNTER — NURSE ONLY (OUTPATIENT)
Dept: FAMILY MEDICINE CLINIC | Facility: CLINIC | Age: 25
End: 2025-03-05
Payer: COMMERCIAL

## 2025-03-05 ENCOUNTER — LAB ENCOUNTER (OUTPATIENT)
Dept: LAB | Facility: HOSPITAL | Age: 25
End: 2025-03-05
Attending: INTERNAL MEDICINE
Payer: COMMERCIAL

## 2025-03-05 DIAGNOSIS — Z11.1 SCREENING-PULMONARY TB: ICD-10-CM

## 2025-03-05 DIAGNOSIS — Z23 NEED FOR TDAP VACCINATION: Primary | ICD-10-CM

## 2025-03-05 PROCEDURE — 86480 TB TEST CELL IMMUN MEASURE: CPT

## 2025-03-05 PROCEDURE — 90471 IMMUNIZATION ADMIN: CPT | Performed by: NURSE PRACTITIONER

## 2025-03-05 PROCEDURE — 90715 TDAP VACCINE 7 YRS/> IM: CPT | Performed by: NURSE PRACTITIONER

## 2025-03-05 PROCEDURE — 36415 COLL VENOUS BLD VENIPUNCTURE: CPT

## 2025-03-07 LAB
M TB IFN-G CD4+ T-CELLS BLD-ACNC: 0.08 IU/ML
M TB TUBERC IFN-G BLD QL: NEGATIVE
M TB TUBERC IGNF/MITOGEN IGNF CONTROL: >10 IU/ML
QFT TB1 AG MINUS NIL: 0.03 IU/ML
QFT TB2 AG MINUS NIL: 0 IU/ML

## 2025-06-27 ENCOUNTER — LAB ENCOUNTER (OUTPATIENT)
Dept: LAB | Facility: HOSPITAL | Age: 25
End: 2025-06-27
Attending: INTERNAL MEDICINE
Payer: COMMERCIAL

## 2025-06-27 DIAGNOSIS — E86.0 DEHYDRATION: ICD-10-CM

## 2025-06-27 DIAGNOSIS — R53.83 FATIGUE, UNSPECIFIED TYPE: ICD-10-CM

## 2025-06-27 LAB
ANION GAP SERPL CALC-SCNC: 6 MMOL/L (ref 0–18)
BASOPHILS # BLD AUTO: 0.03 X10(3) UL (ref 0–0.2)
BASOPHILS NFR BLD AUTO: 0.5 %
BUN BLD-MCNC: 12 MG/DL (ref 9–23)
BUN/CREAT SERPL: 16.7 (ref 10–20)
CALCIUM BLD-MCNC: 9.1 MG/DL (ref 8.7–10.4)
CHLORIDE SERPL-SCNC: 104 MMOL/L (ref 98–112)
CO2 SERPL-SCNC: 26 MMOL/L (ref 21–32)
CREAT BLD-MCNC: 0.72 MG/DL (ref 0.55–1.02)
DEPRECATED RDW RBC AUTO: 46.5 FL (ref 35.1–46.3)
EGFRCR SERPLBLD CKD-EPI 2021: 118 ML/MIN/1.73M2 (ref 60–?)
EOSINOPHIL # BLD AUTO: 0.23 X10(3) UL (ref 0–0.7)
EOSINOPHIL NFR BLD AUTO: 4.2 %
ERYTHROCYTE [DISTWIDTH] IN BLOOD BY AUTOMATED COUNT: 16.6 % (ref 11–15)
FASTING STATUS PATIENT QL REPORTED: NO
GLUCOSE BLD-MCNC: 89 MG/DL (ref 70–99)
HCT VFR BLD AUTO: 30 % (ref 35–48)
HGB BLD-MCNC: 9.5 G/DL (ref 12–16)
IMM GRANULOCYTES # BLD AUTO: 0.02 X10(3) UL (ref 0–1)
IMM GRANULOCYTES NFR BLD: 0.4 %
LYMPHOCYTES # BLD AUTO: 0.92 X10(3) UL (ref 1–4)
LYMPHOCYTES NFR BLD AUTO: 16.6 %
MCH RBC QN AUTO: 24.2 PG (ref 26–34)
MCHC RBC AUTO-ENTMCNC: 31.7 G/DL (ref 31–37)
MCV RBC AUTO: 76.3 FL (ref 80–100)
MONOCYTES # BLD AUTO: 0.84 X10(3) UL (ref 0.1–1)
MONOCYTES NFR BLD AUTO: 15.2 %
NEUTROPHILS # BLD AUTO: 3.49 X10 (3) UL (ref 1.5–7.7)
NEUTROPHILS # BLD AUTO: 3.49 X10(3) UL (ref 1.5–7.7)
NEUTROPHILS NFR BLD AUTO: 63.1 %
OSMOLALITY SERPL CALC.SUM OF ELEC: 281 MOSM/KG (ref 275–295)
PLATELET # BLD AUTO: 264 10(3)UL (ref 150–450)
POTASSIUM SERPL-SCNC: 4.1 MMOL/L (ref 3.5–5.1)
RBC # BLD AUTO: 3.93 X10(6)UL (ref 3.8–5.3)
SODIUM SERPL-SCNC: 136 MMOL/L (ref 136–145)
WBC # BLD AUTO: 5.5 X10(3) UL (ref 4–11)

## 2025-06-27 PROCEDURE — 80048 BASIC METABOLIC PNL TOTAL CA: CPT

## 2025-06-27 PROCEDURE — 36415 COLL VENOUS BLD VENIPUNCTURE: CPT

## 2025-06-27 PROCEDURE — 85025 COMPLETE CBC W/AUTO DIFF WBC: CPT

## (undated) NOTE — LETTER
Date & Time: 7/29/2024, 8:52 PM  Patient: Rocío García  Encounter Provider(s):    Daren Lopes MD       To Whom It May Concern:    Rocío García was seen and treated in our department on 7/29/2024. She should not return to work until 08/03/2024  .    If you have any questions or concerns, please do not hesitate to call.        _____________________________  Physician/APC Signature

## (undated) NOTE — LETTER
Date: 8/13/2019    Patient Name: Teri Johnson      To Whom it may concern: The above patient was seen at the [facility] for her medical care.     On 7/3/19 patient obtained her blood work for school clearance which included vaccine tite

## (undated) NOTE — LETTER
July 9, 2019     [facility] Raquel Marshall      To whom it may concern:     The above patient was seen at our office for a complete physical. The patient is in good health condition and does not require any limitations or res

## (undated) NOTE — LETTER
08/13/20      To Whom it may concern:        Danny Humphrey underwent her annual physical today. She is cleared to start clinicals, pending results of her quantiferon TB test.        If you have any questions please do not hesitate to contact me.